# Patient Record
Sex: FEMALE | Race: WHITE | NOT HISPANIC OR LATINO | Employment: UNEMPLOYED | URBAN - METROPOLITAN AREA
[De-identification: names, ages, dates, MRNs, and addresses within clinical notes are randomized per-mention and may not be internally consistent; named-entity substitution may affect disease eponyms.]

---

## 2024-07-01 ENCOUNTER — OFFICE VISIT (OUTPATIENT)
Dept: URGENT CARE | Facility: CLINIC | Age: 44
End: 2024-07-01
Payer: COMMERCIAL

## 2024-07-01 VITALS
OXYGEN SATURATION: 97 % | BODY MASS INDEX: 26.98 KG/M2 | TEMPERATURE: 98 F | WEIGHT: 171.88 LBS | SYSTOLIC BLOOD PRESSURE: 100 MMHG | RESPIRATION RATE: 17 BRPM | HEIGHT: 67 IN | DIASTOLIC BLOOD PRESSURE: 70 MMHG | HEART RATE: 94 BPM

## 2024-07-01 DIAGNOSIS — H61.91 LESION OF EXTERNAL EAR CANAL, RIGHT: Primary | ICD-10-CM

## 2024-07-01 DIAGNOSIS — H92.01 ACUTE EAR PAIN, RIGHT: ICD-10-CM

## 2024-07-01 PROCEDURE — 99203 OFFICE O/P NEW LOW 30 MIN: CPT | Mod: S$GLB,,, | Performed by: NURSE PRACTITIONER

## 2024-07-01 RX ORDER — OLMESARTAN MEDOXOMIL 20 MG/1
20 TABLET ORAL EVERY MORNING
COMMUNITY
Start: 2024-02-07

## 2024-07-01 RX ORDER — OFLOXACIN 3 MG/ML
5 SOLUTION AURICULAR (OTIC) DAILY
Qty: 5 ML | Refills: 0 | Status: SHIPPED | OUTPATIENT
Start: 2024-07-01 | End: 2024-07-11

## 2024-07-01 RX ORDER — DULOXETIN HYDROCHLORIDE 60 MG/1
60 CAPSULE, DELAYED RELEASE ORAL EVERY MORNING
COMMUNITY
Start: 2024-06-06

## 2024-07-01 RX ORDER — AZITHROMYCIN 250 MG/1
250 TABLET, FILM COATED ORAL DAILY
Qty: 6 TABLET | Refills: 0 | Status: SHIPPED | OUTPATIENT
Start: 2024-07-01 | End: 2024-07-07

## 2024-07-01 NOTE — PROGRESS NOTES
"Subjective:      Patient ID: Micheline Schrader is a 44 y.o. female.    Vitals:  height is 5' 7" (1.702 m) and weight is 78 kg (171 lb 14.4 oz). Her oral temperature is 98 °F (36.7 °C). Her blood pressure is 100/70 and her pulse is 94. Her respiration is 17 and oxygen saturation is 97%.     Chief Complaint: Otalgia    43 y/o female presents to clinic with right ear pain that started Saturday.  Pt states that its painful and feels swollen down into her face also, her hearing is muffled.  Pt denies any injury or fever    Otalgia   There is pain in the right ear. This is a new problem. The current episode started in the past 7 days. The problem has been gradually worsening. There has been no fever. The pain is at a severity of 9/10. Pertinent negatives include no coughing, ear discharge, headaches, rhinorrhea or sore throat. She has tried heat packs (pain pill) for the symptoms. The treatment provided no relief.       HENT:  Positive for ear pain. Negative for ear discharge and sore throat.    Respiratory:  Negative for cough.    Neurological:  Negative for headaches.      Objective:     Physical Exam   Constitutional: She is oriented to person, place, and time. She appears well-developed. She is cooperative.   HENT:   Head: Normocephalic and atraumatic.   Ears:   Right Ear: Hearing, tympanic membrane, external ear and ear canal normal. There is swelling and tenderness.   Left Ear: Hearing, tympanic membrane, external ear and ear canal normal.   Ears:    Nose: Nose normal. No mucosal edema or nasal deformity. No epistaxis. Right sinus exhibits no maxillary sinus tenderness and no frontal sinus tenderness. Left sinus exhibits no maxillary sinus tenderness and no frontal sinus tenderness.   Mouth/Throat: Uvula is midline, oropharynx is clear and moist and mucous membranes are normal. No trismus in the jaw. Normal dentition. No uvula swelling.   Pustular lesion in right ear canal.  Area is red, swollen, and tender.  No " drainage.      Comments: Pustular lesion in right ear canal.  Area is red, swollen, and tender.  No drainage.  Eyes: Conjunctivae and lids are normal.   Neck: Trachea normal and phonation normal. Neck supple.   Cardiovascular: Normal rate, regular rhythm, normal heart sounds and normal pulses.   Pulmonary/Chest: Effort normal and breath sounds normal.   Abdominal: Normal appearance and bowel sounds are normal. Soft.   Musculoskeletal: Normal range of motion.         General: Normal range of motion.   Neurological: She is alert and oriented to person, place, and time. She exhibits normal muscle tone.   Skin: Skin is warm, dry and intact.   Psychiatric: Her speech is normal and behavior is normal. Judgment and thought content normal.   Nursing note and vitals reviewed.      Assessment:     1. Lesion of external ear canal, right    2. Acute ear pain, right        Plan:       Lesion of external ear canal, right  -     ofloxacin (FLOXIN) 0.3 % otic solution; Place 5 drops into the left ear once daily. for 10 days  Dispense: 5 mL; Refill: 0  -     azithromycin (Z-FRANKIE) 250 MG tablet; Take 1 tablet (250 mg total) by mouth once daily. Take two tablets on day one and take one tablet daily for the next four days. for 6 days  Dispense: 6 tablet; Refill: 0    Acute ear pain, right  -     ofloxacin (FLOXIN) 0.3 % otic solution; Place 5 drops into the left ear once daily. for 10 days  Dispense: 5 mL; Refill: 0  -     azithromycin (Z-FRANKIE) 250 MG tablet; Take 1 tablet (250 mg total) by mouth once daily. Take two tablets on day one and take one tablet daily for the next four days. for 6 days  Dispense: 6 tablet; Refill: 0      Outer Ear Infection Discharge Instructions        What care is needed at home?   Ask your doctor what you need to do when you go home. Make sure you ask questions if you do not understand what the doctor says.  Take your drugs as ordered by your doctor.  Sit or lie down with your head to the side and the ear  that needs the ear drops pointing up.  Pull on your ear to straighten the ear canal.  Gently pull the ear up and toward the back of the head to straighten it. If you are giving the ear drops to a child under 3 years of age, gently pull the earlobe down and toward the back of the head.  Put the correct number of drops in your ear.  Gently press the small skin flap over the ear to help the drops run into the ear.  Continue to sit or lie with your head to the side for 5 minutes.  Your doctor may want you to put a small cotton plug in your ear to help keep the drops in place.  Keep the inside of your ear dry while it heals. You can protect your ear when you shower with a petroleum jelly-coated cotton ball. Avoid swimming for 7 to 10 days.  Do not use in-ear head phones (ear buds) or hearing aids while your ear heals.  Heat may help ease your ear pain. If your doctor tells you to use heat, put a heating pad or hot water bottle on your ear for no more than 20 minutes at a time. Never go to sleep with a heating pad on as this can cause burns.

## 2024-07-01 NOTE — PATIENT INSTRUCTIONS
Outer Ear Infection Discharge Instructions        What care is needed at home?   Ask your doctor what you need to do when you go home. Make sure you ask questions if you do not understand what the doctor says.  Take your drugs as ordered by your doctor.  Sit or lie down with your head to the side and the ear that needs the ear drops pointing up.  Pull on your ear to straighten the ear canal.  Gently pull the ear up and toward the back of the head to straighten it. If you are giving the ear drops to a child under 3 years of age, gently pull the earlobe down and toward the back of the head.  Put the correct number of drops in your ear.  Gently press the small skin flap over the ear to help the drops run into the ear.  Continue to sit or lie with your head to the side for 5 minutes.  Your doctor may want you to put a small cotton plug in your ear to help keep the drops in place.  Keep the inside of your ear dry while it heals. You can protect your ear when you shower with a petroleum jelly-coated cotton ball. Avoid swimming for 7 to 10 days.  Do not use in-ear head phones (ear buds) or hearing aids while your ear heals.  Heat may help ease your ear pain. If your doctor tells you to use heat, put a heating pad or hot water bottle on your ear for no more than 20 minutes at a time. Never go to sleep with a heating pad on as this can cause burns.          Please arrange follow up with your primary medical clinic as soon as possible. You must understand that you've received an Urgent Care treatment only and that you may be released before all of your medical problems are known or treated. You, the patient, will arrange for follow up as instructed. If your symptoms worsen or fail to improve you should go to the Emergency Room.         Go to the Emergency Department for any new or worsening symptoms including: worsening abdominal pain, dark\black\bloody bowel movements, vomiting blood, hard abdomen, fever, chest pain,  shortness of breath, loss of consciousness or any other concerns.